# Patient Record
Sex: FEMALE | Race: OTHER | URBAN - METROPOLITAN AREA
[De-identification: names, ages, dates, MRNs, and addresses within clinical notes are randomized per-mention and may not be internally consistent; named-entity substitution may affect disease eponyms.]

---

## 2020-08-05 ENCOUNTER — HOSPITAL ENCOUNTER (OUTPATIENT)
Dept: LAB | Age: 45
Discharge: HOME | End: 2020-08-05

## 2020-08-05 PROCEDURE — 87086 URINE CULTURE/COLONY COUNT: CPT

## 2020-08-05 PROCEDURE — 87186 SC STD MICRODIL/AGAR DIL: CPT

## 2020-08-05 PROCEDURE — 87077 CULTURE AEROBIC IDENTIFY: CPT

## 2020-08-08 LAB
BACTERIA SPEC CULT: ABNORMAL
CC UR VC: ABNORMAL
SERVICE CMNT-IMP: ABNORMAL

## 2020-09-29 NOTE — PATIENT INSTRUCTIONS
Períodos menstruales abundantes: Instrucciones de cuidado  Heavy Menstrual Periods: Care Instructions  Instrucciones de cuidado    Muchas mujeres tienen períodos menstruales abundantes y cólicos dolorosos. Para algunas mujeres esto significa eliminar coágulos de mariano de gran tamaño y cambiarse la toalla sanitaria o el tampón con frecuencia. También pueden tener períodos que newton más de 7 [address] Un cambio hormonal o kinsey irritación uterina puede causar sangrado abundante. Las mujeres que tienen exceso de peso son más propensas a tener períodos menstruales abundantes. Sin embargo, quizá no haya kinsey causa específica para los períodos menstruales abundantes. Michelle médico podría recomendarle tratamientos hormonales para frenar o detener sofia períodos. Si lo que está causando el sangrado intenso es un fibroma uterino (crecimiento que no es cáncer), michelle médico podría recomendarle cirugía u otros tratamientos para extraerlo. Ya que la pérdida de mariano por los períodos menstruales abundantes puede hacer que usted se sienta muy cansada y débil ([place]), michelle médico podría recomendarle que tome duarte adicional.  La atención de seguimiento es kinsey parte clave de michelle tratamiento y seguridad. Asegúrese de hacer y acudir a todas las citas, y llame a michelle médico si está teniendo problemas. También es kinsey buena idea saber los resultados de sofia exámenes y mantener kinsey lista de los medicamentos que jolene. ¿Cómo puede cuidarse en el hogar? · Descanse bastante. · Lleve un registro de sofia períodos. Anote cuándo comienza y cuándo termina michelle período, y [de-identified] flujo tiene. Mesita implica contar la cantidad de toallas sanitarias y tampones que [place]. Anote si están empapados. Anote cualquier otro síntoma. Janelle West registro a sofia citas con el médico.  · [employer] medicamentos exactamente ally le fueron recetados. Llame a michelle médico si beatriz estar teniendo problemas con michelle medicamento.   · [employer] analgésicos (medicamentos para el dolor) exactamente según las indicaciones. ? Si el médico le recetó un analgésico, tómelo según las indicaciones. ? Si no está tomando un analgésico recetado, pregúntele a michelle médico si puede yolis peng de [employer]. · Trate de alcanzar un peso saludable. Si está tratando de bajar de [place], hágalo poco a poco, con la asesoría del médico.  · Si está tomando pastillas de duarte:  ? Trate de yolis las pastillas aproximadamente 1 hora antes o 2 horas después de comer. Sin embargo, podría necesitar yolis el duarte con un poco de comida para evitar el malestar estomacal.  ? La vitamina C (de alimentos o pastillas) ayuda a michelle organismo a absorber el duarte. Trate de yolis las pastillas de duarte con un vaso de jugo de naranja u otra fruta cítrica. ? No tome antiácidos, leche o bebidas con cafeína (ally café, té o bebidas de cola) al mismo tiempo o 2 horas antes o después de magda tomado las pastillas de duarte. Estos pueden dificultar la absorción del duarte en el organismo. ? Las pastillas de duarte pueden causar [employer], ally acidez [place], [place], diarrea, estreñimiento y retortijones. Asegúrese de beber abundantes líquidos e incluya en michelle dieta diaria frutas, verduras y [place]. ? Si olvida yolis michelle pastilla de duarte, no tome kinsey dosis doble la próxima vez.  ? Mantenga las pastillas de duarte fuera del alcance de los niños pequeños. La sobredosis de duarte puede ser [employer]. ¿Cuándo debe pedir ayuda? Llame al 911 en cualquier momento que considere que necesita atención de [place]. Por ejemplo, llame si:    · Se desmayó (perdió el conocimiento). Llame a michelle médico ahora mismo o busque atención médica inmediata si:    · Tiene dolor repentino e intenso en el abdomen o la pelvis.     · Tiene sangrado vaginal intenso. Empapa sofia toallas sanitarias o tampones habituales cada hora alma rosa 2 o más horas.     · Siente mareos o aturdimiento, o que está a punto de desmayarse.    Preste especial atención a los cambios en michelle lacy y asegúrese de comunicarse con michelle médico si:    · Tiene un dolor nuevo en el abdomen o la pelvis.     · Tiene fiebre.     · El sangrado empeora o dura más de 1 semana.     · Piensa que podría estar embarazada. ¿Dónde puede encontrar más información en inglés? Vaya a [email or web address]-raphael.info/  Geovanny Montoya F477 en la búsqueda para aprender más acerca de \"Períodos menstruales abundantes: Instrucciones de cuidado. \"  Revisado: 8 noviembre, 0539               ZKCFUEQ del contenido: 12.6  © 2006-2020 Healthwise, Incorporated. Las instrucciones de cuidado fueron adaptadas bajo licencia por [employer] easily beat [employer] (which disclaims liability or warranty for this information). Si usted tiene [place] afección médica o sobre estas instrucciones, siempre pregunte a michelle profesional de lacy. Healthwise, Incorporated niega toda garantía o responsabilidad por michelle uso de esta información. Biopsia de endometrio: Sobre esta prueba  Endometrial Biopsy: About This Test  ¿Qué es? Kinsey biopsia de endometrio es kinsey manera en la que michelle médico jolene kinsey pequeña [place] del revestimiento del útero (endometrio). La muestra se observa bajo un microscopio para janes si contiene células anormales. La biopsia de endometrio ayuda a michelle médico a detectar problemas en el endometrio. ¿Por qué se hace esta prueba? Kinsey biopsia de endometrio se hace para detectar el cáncer uterino. La prueba también se hace si usted tiene sangrado anormal en el útero. [employer] de la prueba muestran cómo las hormonas de michelle cuerpo están afectando el revestimiento del Shobha Spinner, ally alma rosa la menopausia. ¿Cómo se prepara usted para esta prueba? · Si jolene aspirina o algún otro medicamento para prevenir la formación de coágulos sanguíneos, pregúntele a michelle médico si debe dejar de yolis esos medicamentos antes de la prueba. Asegúrese de que entiende exactamente lo que michelle médico quiere que jose.  Estos medicamentos aumentan el riesgo de sangrado. · Pregúntele a michelle médico si debe yolis un analgésico (medicamento para el dolor), ally ibuprofeno (Advil o Motrin), entre 30 y 60 minutos antes de la prueba. [place] puede ayudar a reducir el dolor de los cólicos que la prueba pueda causar. ¿Qué ocurre antes de la prueba? · Deberá vaciar la vejiga inmediatamente antes de la prueba. · Se le solicitará que firme un formulario de consentimiento que indica que comprende los riesgos de la prueba y que acepta Marcin Mcginnisothy. ¿Cómo se hace la prueba? · Se recostará boca arriba en kinsey ramos de exploración. Tendrá los pies apoyados en estribos. · Es posible que el médico utilice un aerosol o kinsey inyección para adormecerle el eugenia uterino. El eugenia uterino es la abertura del [place]. · El médico usará un instrumento llamado espéculo para observar el eugenia uterino. · Luego, el médico pasará un tubo rosa a través del eugenia uterino para yolis [place] del revestimiento del [place]. · La muestra se envía a un laboratorio. ¿Cuánto tiempo dura la prueba? La prueba durará entre 5 y 15 minutos, aproximadamente. ¿Qué ocurre después de la prueba? · Probablemente pueda irse a casa de inmediato. · Es probable que tenga un leve sangrado vaginal y retortijones por unos días después de la prueba. Los retortijones pueden sentirse ally cólicos menstruales [place]. ¿Dónde puede encontrar más información en inglés? Vaya a http://geetha-raphael.info/  Ariel Gess V957 en la búsqueda para aprender más acerca de \"Biopsia de endometrio: Sobre esta prueba. \"  Revisado: 8 noviembre, 3372               YKIVHTN del contenido: 12.6  © 5216-1109 [employer], Incorporated. Las instrucciones de cuidado fueron adaptadas bajo licencia por [employer] Conne[employer] (which disclaims liability or warranty for this information). Si usted tiene [place] afección médica o sobre estas instrucciones, siempre pregunte a michelle profesional de lacy. Healthwise, Incorporated niega toda garantía o responsabilidad por michelle uso de esta información.

## 2020-09-30 ENCOUNTER — OFFICE VISIT (OUTPATIENT)
Dept: OBGYN CLINIC | Age: 45
End: 2020-09-30
Payer: SUBSIDIZED

## 2020-09-30 VITALS — WEIGHT: 188 LBS | DIASTOLIC BLOOD PRESSURE: 88 MMHG | SYSTOLIC BLOOD PRESSURE: 134 MMHG

## 2020-09-30 DIAGNOSIS — N92.4 EXCESSIVE BLEEDING IN PREMENOPAUSAL PERIOD: ICD-10-CM

## 2020-09-30 DIAGNOSIS — N93.9 ABNORMAL UTERINE BLEEDING (AUB): Primary | ICD-10-CM

## 2020-09-30 DIAGNOSIS — Z97.5 IUD (INTRAUTERINE DEVICE) IN PLACE: ICD-10-CM

## 2020-09-30 DIAGNOSIS — D64.9 ANEMIA, UNSPECIFIED TYPE: ICD-10-CM

## 2020-09-30 LAB
HCG URINE, QL. (POC): NEGATIVE
VALID INTERNAL CONTROL?: YES

## 2020-09-30 PROCEDURE — 99203 OFFICE O/P NEW LOW 30 MIN: CPT | Performed by: OBSTETRICS & GYNECOLOGY

## 2020-09-30 PROCEDURE — 81025 URINE PREGNANCY TEST: CPT | Performed by: OBSTETRICS & GYNECOLOGY

## 2020-09-30 RX ORDER — LANOLIN ALCOHOL/MO/W.PET/CERES
CREAM (GRAM) TOPICAL
COMMUNITY
Start: 2020-08-14 | End: 2021-07-26

## 2020-09-30 NOTE — PROGRESS NOTES
[address] [facility]  [email or web address]  [phone]    Trish Crigler, MD, [address] [facility]       OB/GYN Problem visit    Chief Complaint:   Chief Complaint   Patient presents with   Valri Davidson Menstrual Problem        [id number] used today     Last or next WWE is: pt is new     History of Present Illness: This is a new problem being evaluated by this provider. The patient is a 39 y.o. No obstetric history on file. female who reports having abnormal vaginal bleeding for 2 months. Heavy menses but typical since got [place].   hgb down from ~11 to ~8 outside clinic. Outside [address], wnl: ES 7mm  She reports the symptoms are is unchanged. Aggravating factors include none. Alleviating factors include none. Not dizzy/weak. She does not have other concerns. LMP: Patient's last menstrual period was 09/21/2020. PFSH:  History reviewed. No pertinent past medical history. History reviewed. No pertinent surgical history. History reviewed. No pertinent family history. Social History     Tobacco Use    Smoking status: Not on file   Substance Use Topics    Alcohol use: Not on file    Drug use: Not on file     Not on File  Current Outpatient Medications   Medication Sig    ferrous sulfate 325 mg (65 mg iron) tablet TAKE 1 TABLET BY MOUTH TWICE DAILY     No current facility-administered medications for this visit.         Review of Systems:  History obtained from the patient  Constitutional: negative for fevers, chills and weight loss  ENT ROS: negative for - hearing change, oral lesions or visual changes  Respiratory: negative for cough, wheezing or dyspnea on exertion  Cardiovascular: negative for chest pain, irregular heart beats, exertional chest pressure/discomfort  Gastrointestinal: negative for dysphagia, nausea and vomiting  Genito-Urinary ROS:  see HPI  Inteument/breast: negative for rash, breast lump and nipple discharge  Musculoskeletal:negative for stiff joints, neck pain and muscle weakness  Endocrine ROS: negative for - breast changes, galactorrhea or temperature intolerance  Hematological and Lymphatic ROS: negative for - blood clots, bruising or swollen lymph nodes    Physical Exam:  Pain -0  Visit Vitals  /88   Wt 188 lb (85.3 kg)       GENERAL: alert, well appearing, and in no distress  HEAD: normocephalic, atraumatic. PULM: clear to auscultation, no wheezes, rales or rhonchi, symmetric air entry   COR: normal rate and regular rhythm, S1 and S2 normal   ABDOMEN: soft, nontender, nondistended, no masses or organomegaly   EGBUS: no lesions, no inflammation, no masses  VULVA: normal appearing vulva with no masses, tenderness or lesions  VAGINA: normal appearing vagina with normal color, no lesions, no discharge  CERVIX: normal appearing cervix without discharge or lesions, non tender  · IUD strings seen and appropriate length  UTERUS: uterus is normal size, shape, consistency and nontender   ADNEXA: normal adnexa in size, nontender and no masses  NEURO: alert, oriented, normal speech    Assessment:  Encounter Diagnoses   Name Primary?  Abnormal uterine bleeding (AUB) Yes    IUD (intrauterine device) in place     Excessive bleeding in premenopausal period     Anemia, unspecified type        Plan:  The patient is advised that she should contact the office if she does not note improvement or if symptoms recur  Recommend follow up with PCP for non-gynecologic complaints and chronic medical problems. She should contact our office with any questions or concerns  She could keep her routine annual exam appointment. rec fu endo bx  Get pap records  Disc hormonal options, changing IUD to Mirena, progesterone management options. Bleeding precautions  Continue iron. We discussed potential causes of symptomatic bleeding: including but not limited to hormonal, medical, infection/inflammation and structural etiologies.     Shanda ramirez ([place])    Reviewed clinic notes, labs, us, wwe    Orders Placed This Encounter    CT/NG/T.VAGINALIS AMPLIFICATION    AMB POC URINE PREGNANCY TEST, VISUAL COLOR COMPARISON       No results found for this visit on 09/30/20.

## 2020-10-03 LAB
C TRACH RRNA SPEC QL NAA+PROBE: NEGATIVE
N GONORRHOEA RRNA SPEC QL NAA+PROBE: NEGATIVE
T VAGINALIS DNA SPEC QL NAA+PROBE: NEGATIVE

## 2021-07-23 NOTE — PATIENT INSTRUCTIONS
Endometrial Biopsy: About This Test  What is it? An endometrial biopsy is a way for your doctor to take a small sample of the lining of the uterus (endometrium). The sample is looked at under a microscope for abnormal cells. An endometrial biopsy helps your doctor find problems in the endometrium. Why is this test done? An endometrial biopsy is done to check for cancer of the uterus. The test is also done if you have abnormal bleeding from your uterus. The test results show how your body's hormones are affecting the lining of the uterus, such as during menopause. How do you prepare for the test?  · If you take aspirin or some other blood thinner, ask your doctor if you should stop taking it before your test. Make sure that you understand exactly what your doctor wants you to do. These medicines increase the risk of bleeding. · Ask your doctor if you should take a pain reliever, such as ibuprofen (Advil or Motrin), 30 to 60 minutes before the test. This can help reduce any cramping pain that the test can cause. How is the test done? · You will lie on an exam table. Your feet will be in stirrups. · The doctor may use a spray or injection to numb your cervix. The cervix is the opening to the uterus. · The doctor will use a tool called a speculum to see the cervix. · Then the doctor will pass a thin tube through the cervix to take a sample of the uterus lining. · The sample is sent to a lab. How long does the test take? The test will take about 5 to 15 minutes. What happens after the test?  · You will probably be able to go home right away. · You likely will have mild vaginal bleeding and may have cramps for a few days after the test. The cramps may feel like bad menstrual cramps. How can you care for yourself at home? · Ask your doctor if you can take an over-the-counter pain medicine, such as acetaminophen (Tylenol), ibuprofen (Advil, Motrin), or naproxen (Aleve). Be safe with medicines.  Read and follow all instructions on the label. · Use pads for vaginal bleeding or discharge. · You may return to all your usual activities (including sex) when you feel like it. Follow-up care is a key part of your treatment and safety. Be sure to make and go to all appointments, and call your doctor if you are having problems. It's also a good idea to keep a list of the medicines you take. Ask your doctor when you can expect to have your test results. Where can you learn more? Go to http://www.gray.com/  Enter V957 in the search box to learn more about \"Endometrial Biopsy: About This Test.\"  Current as of: July 17, 2020               Content Version: 12.8  © 2006-2021 Healthwise, Incorporated. Care instructions adapted under license by [employer] (which disclaims liability or warranty for this information). If you have questions about a medical condition or this instruction, always ask your healthcare professional. [place] [address] any warranty or liability for your use of this information.

## 2021-07-26 ENCOUNTER — OFFICE VISIT (OUTPATIENT)
Dept: OBGYN CLINIC | Age: 46
End: 2021-07-26
Payer: COMMERCIAL

## 2021-07-26 VITALS
HEIGHT: 60 IN | BODY MASS INDEX: 37.46 KG/M2 | HEART RATE: 74 BPM | SYSTOLIC BLOOD PRESSURE: 128 MMHG | WEIGHT: 190.8 LBS | DIASTOLIC BLOOD PRESSURE: 86 MMHG

## 2021-07-26 DIAGNOSIS — N92.6 IRREGULAR MENSES: ICD-10-CM

## 2021-07-26 DIAGNOSIS — Z97.5 IUD (INTRAUTERINE DEVICE) IN PLACE: ICD-10-CM

## 2021-07-26 DIAGNOSIS — N93.9 ABNORMAL UTERINE BLEEDING (AUB): Primary | ICD-10-CM

## 2021-07-26 DIAGNOSIS — Z01.812 PRE-PROCEDURAL LABORATORY EXAMINATION: ICD-10-CM

## 2021-07-26 LAB
HCG URINE, QL. (POC): NEGATIVE
VALID INTERNAL CONTROL?: YES

## 2021-07-26 PROCEDURE — 58100 BIOPSY OF UTERUS LINING: CPT | Performed by: OBSTETRICS & GYNECOLOGY

## 2021-07-26 PROCEDURE — 81025 URINE PREGNANCY TEST: CPT | Performed by: OBSTETRICS & GYNECOLOGY

## 2021-07-26 PROCEDURE — 99213 OFFICE O/P EST LOW 20 MIN: CPT | Performed by: OBSTETRICS & GYNECOLOGY

## 2021-07-26 RX ORDER — MEDROXYPROGESTERONE ACETATE 10 MG/1
TABLET ORAL
COMMUNITY
Start: 2021-06-21 | End: 2021-07-26

## 2021-07-26 RX ORDER — MEDROXYPROGESTERONE ACETATE 150 MG/ML
150 INJECTION, SUSPENSION INTRAMUSCULAR ONCE
Qty: 1 ML | Refills: 1 | Status: SHIPPED | OUTPATIENT
Start: 2021-07-26 | End: 2021-07-26

## 2021-07-26 NOTE — PROGRESS NOTES
[address] [facility]  [email or web address]  [phone]    Mary Jade MD, [address] [facility]       OB/GYN Problem visit    Chief Complaint:   Chief Complaint   Patient presents with    Endometrial Biopsy       Last or next WWE is: due    : [id number]      History of Present Illness: This is not a new problem being evaluated by this provider. The patient is a 55 y.o. LMP 7/14 then spotting on Friday 7/23. Her cycles are sometimes heavy, sometimes light. She has a cycle every month, sometimes twice per month. Her sx have been going on the past year. Pt took provera this month she got from [employer]". She will sign CHUCK at the end of her visit. Denies cramping. Had wwe/pap and labs done at [address] [place] [address] and was also seen at [address] [facility] last week. She does not have other concerns. LMP: Patient's last menstrual period was 07/14/2021. PFSH:  Past Medical History:   Diagnosis Date    Pap smear for cervical cancer screening 07/17/2018    Negative, HPV negative     No past surgical history on file. No family history on file. Social History     Tobacco Use    Smoking status: Never Smoker    Smokeless tobacco: Never Used   Substance Use Topics    Alcohol use: Not on file    Drug use: Not on file     No Known Allergies  Current Outpatient Medications   Medication Sig    medroxyPROGESTERone (DEPO-PROVERA) 150 mg/mL injection 1 mL by IntraMUSCular route once for 1 dose. Hold for nurse injection.  medroxyPROGESTERone (PROVERA) 10 mg tablet TAKE 1 TABLET BY MOUTH ONCE DAILY WITH FOOD FOR 10 DAYS (Patient not taking: Reported on 7/26/2021)    ferrous sulfate 325 mg (65 mg iron) tablet TAKE 1 TABLET BY MOUTH TWICE DAILY (Patient not taking: Reported on 7/26/2021)     No current facility-administered medications for this visit.        Review of Systems:  History obtained from the patient  Constitutional: negative for fevers, chills and weight loss  ENT ROS: negative for - hearing change, oral lesions or visual changes  Respiratory: negative for cough, wheezing or dyspnea on exertion  Cardiovascular: negative for chest pain, irregular heart beats, exertional chest pressure/discomfort  Gastrointestinal: negative for dysphagia, nausea and vomiting  Genito-Urinary ROS:  see HPI  Inteument/breast: negative for rash, breast lump and nipple discharge  Musculoskeletal:negative for stiff joints, neck pain and muscle weakness  Endocrine ROS: negative for - breast changes, galactorrhea or temperature intolerance  Hematological and Lymphatic ROS: negative for - blood clots, bruising or swollen lymph nodes    Physical Exam:  Visit Vitals  /86 (BP 1 Location: Right upper arm, BP Patient Position: Sitting, BP Cuff Size: Adult)   Pulse 74   Ht 5' (1.524 m)   Wt 190 lb 12.8 oz (86.5 kg)   BMI 37.26 kg/m²       GENERAL: alert, well appearing, and in no distress  HEAD: normocephalic, atraumatic. PULM: clear to auscultation, no wheezes, rales or rhonchi, symmetric air entry   COR: normal rate and regular rhythm, S1 and S2 normal   ABDOMEN: soft, nontender, nondistended, no masses or organomegaly   EGBUS: no lesions, no inflammation, no masses  VULVA: normal appearing vulva with no masses, tenderness or lesions  VAGINA: normal appearing vagina with normal color, no lesions, minimal blood tinged discharge  CERVIX: normal appearing cervix without discharge or lesions, non tender  · IUD strings seen and appropriate length  UTERUS: uterus is normal size, shape, consistency and nontender   ADNEXA: normal adnexa in size, nontender and no masses  NEURO: alert, oriented, normal speech    Assessment:  Encounter Diagnoses   Name Primary?     Abnormal uterine bleeding (AUB) Yes    Pre-procedural laboratory examination     Irregular menses     IUD (intrauterine device) in place        Plan:  The patient is advised that she should contact the office if she does not note improvement or if symptoms recur  Recommend follow up with PCP for non-gynecologic complaints and chronic medical problems. She should contact our office with any questions or concerns  She could keep her routine annual exam appointment. We discussed potential causes of symptomatic bleeding: including but not limited to hormonal, medical, infection/inflammation and structural etiologies. We discussed options for managing symptoms including but not limited to observation, NSAIDS, hormonal management, IUDs, ablation, and hysterectomy. Pt too late to do [address] but wants to proceed with endo bx  We discussed progesterone only and non hormonal options for contraception including but not limited to condoms, IUDs, Nexplanon, and depo provera. Pt wants to do trial of depo provera  We discussed typical perimenopausal bleeding patterns and symptoms. I recommend that she notify MD for chaotic or symptomatic bleeding, or bleeding in between menses or intermenstrual bleeding for additional evaluation. She can also schedule a consult to discuss management of symptoms of perimenopause that are concerning her or interfering with her life or day to day function or activity. Get WWE last pap records  Get ER records    Rec fu and US possible SIS, await bx results    Can have depo started at [facility] or [facility]. On this date, 7/26/2021,  I have spent 20 minutes reviewing previous notes, test results and face to face with the patient discussing the diagnosis and importance of compliance with the treatment plan as well as documenting on the day of the visit.  used.       Orders Placed This Encounter    BIOPSY OF UTERUS LINING    AMB POC URINE PREGNANCY TEST, VISUAL COLOR COMPARISON    medroxyPROGESTERone (DEPO-PROVERA) 150 mg/mL injection    SURGICAL PATHOLOGY       Results for orders placed or performed in visit on 07/26/21   AMB POC URINE PREGNANCY TEST, VISUAL COLOR COMPARISON   Result Value Ref Range    VALID INTERNAL CONTROL POC Yes     HCG urine, Ql. (POC) Negative Negative

## 2021-07-26 NOTE — PROGRESS NOTES
Daly Warren MD, [address] [facility]  [address]  [address] [facility]  [phone]     [facility]  OFFICE PROCEDURE PROGRESS NOTE        Chart reviewed for the following:   Betty GROVER, have reviewed the History, Physical and updated the Allergic reactions for Rahu 37 performed immediately prior to start of procedure:   Betty GROVER, have performed the following reviews on [address] prior to the start of the procedure:            * Patient was identified by name and date of birth   * Agreement on procedure being performed was verified  * Risks and Benefits explained to the patient  * Procedure site verified and marked as necessary  * Patient was positioned for comfort  * Consent was signed and verified     Time: 4:20pm      Date of procedure: 7/26/2021    Procedure performed by: Cathryn Aguirre MD    Provider assisted by: Betty Villalobos, [address]    Patient assisted by: self    How tolerated by patient: tolerated the procedure well with no complications    Post Procedural Pain Scale: 0 - No Hurt    Comments: none              Procedure note: Endometrial biopsy     [id number] is a No obstetric history on file. ,  55 y.o. female / No LMP recorded. The patient has a history of . AUB/ irregular menses. Has Paraguard IUD  After the indications, risks, benefits, and alternatives to performing an endometrial biopsy were explained to the patient, her questions were answered and informed consent was obtained. Patient wanted to proceed with office endometrial biopsy evaluation. Procedure: The patient was placed on the table in the dorsal lithotomy position. A bimanual exam showed the uterus to be anterior. The uterus was not enlarged. A speculum was placed in the vagina. The cervix was visualized and prepped with zephrin. A tenaculum was not placed on the anterior lip of the cervix for traction. It was was not necessary to dilate the cervix. A pipelle was passed through the endocervical canal without difficulty. The uterus was sounded to 7 cm's. A medium amount of tissue was returned. This tissue was placed in formalin and sent to pathology. It was felt that an adequate sample was obtained. The patient tolerated the procedure well and she reported level of cramping as mild. Good hemostasis was noted. All instruments were removed. Post Procedural Status: The patient was observed for 10 minutes after the procedure. She had pain level:  mild at the time of discharge. There were no complications. The patient was discharged in stable condition. The patient was given routine post endometrial biopsy instructions. She should notify MD with any questions, concerns, fever, or worsening pain. We discussed that she will be contacted with the pathology results.      Consider US vs SIS  Get records

## 2021-08-08 NOTE — PROGRESS NOTES
PMH: endo bx  Pathology: not sufficient, benign endocervical  (Need to repeat endometrial sampling)  Notify pt if natue message not read or not active.   Update PMH, (even for office procedure) Include date of procedure  procedure name (check office note prn),   Rec SIS and repeat endometrial biopsy (may need )

## 2021-08-30 NOTE — PROGRESS NOTES
ID: [id number]    Follow up call to Ms. Deya Floyd - Pt verified self and birth date for privacy precautions. Patient was advised of results. Pt has been lightly bleeding for 3 weeks. Pt is placed on the schedule for 9/16 for an SIS & repeat endo bx. Pt is going to call a few days before her appt to tell us how much bleeding she is having; advised we prefer to do the procedure when she is not bleeding for best results. Pt was advised to premedicate with Ibuprofen 1 hour prior to her appt to help with discomfort. Ms. Deya Floyd acknowledged understanding and all questions were answered to patients satisfaction. No further questions or concerns at this time.

## 2021-09-16 ENCOUNTER — TELEPHONE (OUTPATIENT)
Dept: OBGYN CLINIC | Age: 46
End: 2021-09-16

## 2021-09-16 NOTE — TELEPHONE ENCOUNTER
Pt calling to see if she can still have her procedures done today. Pt is still bleeding but lighter. This RN confirmed with MD that she is able to still come for procedures. Pt verbalized understanding.

## 2021-12-16 ENCOUNTER — HOSPITAL ENCOUNTER (OUTPATIENT)
Dept: LAB | Age: 46
Discharge: HOME | End: 2021-12-16

## 2021-12-16 ENCOUNTER — OFFICE VISIT (OUTPATIENT)
Dept: OBGYN CLINIC | Age: 46
End: 2021-12-16
Payer: COMMERCIAL

## 2021-12-16 VITALS
WEIGHT: 192.2 LBS | SYSTOLIC BLOOD PRESSURE: 131 MMHG | BODY MASS INDEX: 37.54 KG/M2 | DIASTOLIC BLOOD PRESSURE: 88 MMHG | HEART RATE: 92 BPM

## 2021-12-16 DIAGNOSIS — Z30.432 ENCOUNTER FOR IUD REMOVAL: ICD-10-CM

## 2021-12-16 DIAGNOSIS — N93.9 ABNORMAL UTERINE BLEEDING (AUB): Primary | ICD-10-CM

## 2021-12-16 DIAGNOSIS — Z01.812 PRE-PROCEDURE LAB EXAM: ICD-10-CM

## 2021-12-16 LAB
HCG URINE, QL. (POC): NEGATIVE
VALID INTERNAL CONTROL?: YES

## 2021-12-16 PROCEDURE — 99213 OFFICE O/P EST LOW 20 MIN: CPT | Performed by: OBSTETRICS & GYNECOLOGY

## 2021-12-16 PROCEDURE — 58301 REMOVE INTRAUTERINE DEVICE: CPT | Performed by: OBSTETRICS & GYNECOLOGY

## 2021-12-16 PROCEDURE — 81025 URINE PREGNANCY TEST: CPT | Performed by: OBSTETRICS & GYNECOLOGY

## 2021-12-16 PROCEDURE — 58100 BIOPSY OF UTERUS LINING: CPT | Performed by: OBSTETRICS & GYNECOLOGY

## 2021-12-16 RX ORDER — NORETHINDRONE ACETATE AND ETHINYL ESTRADIOL 1MG-20(21)
1 KIT ORAL DAILY
Qty: 3 DOSE PACK | Refills: 0 | Status: SHIPPED | OUTPATIENT
Start: 2021-12-16 | End: 2022-03-15 | Stop reason: SDUPTHER

## 2021-12-16 NOTE — PATIENT INSTRUCTIONS
Píldoras anticonceptivas combinadas: Instrucciones de cuidado  Combination Birth Control Pills: Care Instructions  Generalidades     Las píldoras anticonceptivas combinadas se [place] para prevenir el [facility]. Le suministran kinsey dosis regular de las hormonas estrógeno y progestina. Usted jolene kinsey [employer] días para prevenir el embarazo. Las píldoras anticonceptivas vienen en paquetes. El tipo más común tiene píldoras de hormonas para 3 semanas. Algunos paquetes tienen píldoras de azúcar (que no contienen ningún tipo de hormonas) para la cuarta semana. Alma Rosa la cuarta semana, la sin hormona, tiene michelle período. Después de la cuarta semana (28 días), empieza con un nuevo paquete. Algunas píldoras anticonceptivas vienen envasadas de diferentes maneras. Por ejemplo, algunas contienen pastillas de hormonas para la cuarta semana en lugar de pastillas de azúcar. [place] se conoce ally uso continuo. Marlen hormonas alma rosa todo el mes hace que no tenga períodos o que tenga menos períodos. Otras están envasadas de modo que tenga un período cada 3 meses. Michelle médico le dirá qué tipo de pastillas tiene usted. La atención de seguimiento es kinsey parte clave de michelle tratamiento y seguridad. Asegúrese de hacer y acudir a todas las citas, y llame a michelle médico si está teniendo problemas. También es kinsey buena idea saber los resultados de sofia exámenes y mantener kinsey lista de los medicamentos que jolene. ¿Cómo puede cuidarse en el hogar? ¿Cómo se jolene la píldora anticonceptiva? · Siga las instrucciones de michelle médico sobre cuándo empezar a [employer]. Use un método anticonceptivo de respaldo, ally un condón, o no tenga relaciones sexuales alma rosa 7 días después de empezar con las píldoras. · [place] sofia píldoras todos los días, aproximadamente a la misma hora del día. Para facilitar esto, trate de tomarlas con algo que hace cada día, ally cepillarse los dientes.   · Usted puede usar la píldora continuamente y saltarse michelle período. Cuando llega la semana en la que jolene pastillas sin hormonas, omita esas pastillas y comience de inmediato con michelle próximo paquete de pastillas anticonceptivas. Continúe tomando michelle [employer]. Hable con michelle médico si tiene cualquier pregunta. ¿Qué ocurre si olvida yolis kinsey pastilla? Siempre juan la etiqueta para obtener instrucciones específicas, o llame a michelle médico. Aquí hay algunas pautas básicas:  · Si omitió yolis 1 pastilla de hormonas, tómela tan pronto ally se acuerde. Pregúntele a michelle médico si irvin vez tenga que usar un método anticonceptivo de [place], ally un condón, o no tener relaciones sexuales. · Si omitió 2 o más pastillas de hormonas, tome kinsey tan pronto ally recuerde que se olvidó de tomarlas. Luego, juan la etiqueta de las pastillas o llame a michelle médico acerca de instrucciones de cómo yolis las pastillas omitidas. Use un método anticonceptivo de respaldo o no tenga relaciones sexuales alma rosa 7 nell. [place] es más probable si se olvida de yolis más de 1 [place]. · Si tuvo relaciones sexuales, puede usar un anticonceptivo de emergencia para ayudar a prevenir el [place]. El método anticonceptivo de emergencia más eficaz es el DIU de cobre (insertado por un médico). También puede obtener pastillas anticonceptivas de emergencia sin Erin Riggers en la mayoría de las [place]. ¿Qué más debe saber? · La píldora puede tener efectos secundarios. ? Puede tener períodos omitidos o muy ligeros. ? Puede tener [employer] períodos Burch Stefania). [place] suele disminuir al cabo de 3 o 4 meses. Si [place] la píldora continuamente, no tendrá períodos. Keith es posible que aún tenga sangrado intermenstrual. Consulte a michelle médico si tiene problemas de sangrado intermenstrual. Incluso si tiene mihir tipo de sangrado, la píldora aún debería funcionar blake. ? Irvin vez tenga inestabilidad emocional, menos interés en el sexo o aumento de [place].   · La píldora puede reducir el acné, el sangrado abundante y los cólicos, y los síntomas del síndrome premenstrual.  · Consulte a michelle médico antes de usar ningún otro medicamento, incluidos medicamentos de venta buck, vitaminas, productos herbarios y suplementos. Las hormonas anticonceptivas podrían no ser menezes eficaces para prevenir el embarazo cuando se combinan con otros medicamentos. · La píldora no protege contra las infecciones de transmisión sexual (STI, por sofia siglas en inglés), ally el herpes o el VIH/SIDA. Si no está starr de si michelle calli o parejas sexuales pudieran tener kinsey STI, utilice un condón para ayudar a protegerse de la enfermedad. ¿Cuándo debe pedir ayuda? Llame a michelle médico ahora mismo o busque atención médica inmediata si:    · Tiene dolor abdominal intenso.     · Tiene señales de un coágulo de [place], ally:  ? Dolor en la pantorrilla, el muslo, la [place] o detrás de la rodilla. ? Enrojecimiento e hinchazón en la pierna o la [place].     · Tiene visión borrosa u otros problemas de la visión.     · Tiene un dolor de phuc intenso.     · Tiene graves dificultades para respirar. Preste especial atención a los cambios en michelle lacy y asegúrese de comunicarse con michelle médico si:    · Piensa que podría estar embarazada.     · Piensa que puede estar deprimida.     · Ashlee que puede magda Art expuesta a, o tiene, kinsey infección de transmisión sexual.   ¿Dónde puede encontrar más información en inglés? Vaya a [email or web address]  Nellie Paz [id number] en la búsqueda para aprender más acerca de \"Píldoras anticonceptivas combinadas: Instrucciones de cuidado. \"  Revisado: 11 febrero, 2021               Versión del contenido: 13.0  © 2301-0157 Healthwise, Incorporated. Las instrucciones de cuidado fueron adaptadas bajo licencia por [employer] (which disclaims liability or warranty for this information).  Si usted tiene [place] afección médica o sobre estas instrucciones, siempre pregunte a michelle profesional de lacy. [facility] niega toda garantía o responsabilidad por michelle uso de esta información.

## 2021-12-16 NOTE — PROGRESS NOTES
Video  ID [id number]       [facility]  [email or web address]  [phone]    Rashard Loomis MD, FACOG       OB/GYN Problem visit    Chief Complaint:   Chief Complaint   Patient presents with    Vaginal Bleeding    Follow-up    Ultrasound     SIS    Removal Iud        History of Present Illness: This is not a new problem being evaluated by this provider. The patient is a 55 y.o. No obstetric history on file. female who reports having AUB  for several  months. She reports the symptoms are has improved. Aggravating factors include none. Alleviating factors include depo provera. She does not have other concerns. LMP: No LMP recorded. PFSH:  Past Medical History:   Diagnosis Date    Pap smear for cervical cancer screening 07/17/2018    Negative, HPV negative     Past Surgical History:   Procedure Laterality Date    HX OTHER SURGICAL  07/26/2021    endometrial biopsy: benign/unsuffiecent endometrium --> rec repeat; TP     No family history on file. Social History     Tobacco Use    Smoking status: Never Smoker    Smokeless tobacco: Never Used   Substance Use Topics    Alcohol use: Not on file    Drug use: Not on file     No Known Allergies  Current Outpatient Medications   Medication Sig    norethindrone-ethinyl estradiol (JUNEL FE 1/20) 1 mg-20 mcg (21)/75 mg (7) tab Take 1 Tablet by mouth daily.  FeroSuL 325 mg (65 mg iron) tablet     medroxyPROGESTERone (Depo-Provera) 150 mg/mL syrg 1 ml     No current facility-administered medications for this visit.        Review of Systems:  History obtained from the patient  Constitutional: negative for fevers, chills and weight loss  ENT ROS: negative for - hearing change, oral lesions or visual changes  Respiratory: negative for cough, wheezing or dyspnea on exertion  Cardiovascular: negative for chest pain, irregular heart beats, exertional chest pressure/discomfort  Gastrointestinal: negative for dysphagia, nausea and vomiting  Genito-Urinary ROS:  see HPI  Inteument/breast: negative for rash, breast lump and nipple discharge  Musculoskeletal:negative for stiff joints, neck pain and muscle weakness  Endocrine ROS: negative for - breast changes, galactorrhea or temperature intolerance  Hematological and Lymphatic ROS: negative for - blood clots, bruising or swollen lymph nodes    Physical Exam:  Visit Vitals  /88   Pulse 92   Wt 192 lb 3.2 oz (87.2 kg)   BMI 37.54 kg/m²       GENERAL: alert, well appearing, and in no distress  HEAD: normocephalic, atraumatic. ABDOMEN: soft, nontender, nondistended, no masses or organomegaly   EGBUS: no lesions, no inflammation, no masses  VULVA: normal appearing vulva with no masses, tenderness or lesions  VAGINA: normal appearing vagina with normal color, no lesions, without discharge  CERVIX: normal appearing cervix without discharge or lesions, non tender  · IUD strings seen and appropriate length  UTERUS: uterus is normal size, shape, consistency and nontender   ADNEXA: normal adnexa in size, nontender and no masses  NEURO: alert, oriented, normal speech    Assessment:  Encounter Diagnoses   Name Primary?  Abnormal uterine bleeding (AUB) Yes    Pre-procedure lab exam     Encounter for IUD removal        Plan:  The patient is advised that she should contact the office if she does not note improvement or if symptoms recur  Recommend follow up with PCP for non-gynecologic complaints and chronic medical problems. She should contact our office with any questions or concerns  She could keep her routine annual exam appointment. We discussed potential causes of symptomatic bleeding: including but not limited to hormonal, medical, infection/inflammation and structural etiologies. We discussed options for managing symptoms including but not limited to observation, NSAIDS, hormonal management, IUDs, ablation, and hysterectomy.   Pt desires IUD removal and changing methods  We discussed progesterone only and non hormonal options for contraception including but not limited to condoms, IUDs, Nexplanon, and depo provera. Discussed risks, benefits and alternatives of OCP/nuvaring/patch: including but not limited to dvt/pe/mi/cva/ca/gi risks and that smoking, increasing age and other health conditions can increase these risks. Pt wants to retry OCP  On this date, 12/16/2021,  I have spent 20 minutes reviewing previous notes, test results and face to face with the patient discussing the diagnosis and importance of compliance with the treatment plan as well as documenting on the day of the visit. FU 3mos wwe/pap if due and fu sx  Disc how to start OCP and back up with new start        Physician review of ultrasound performed by technician    Today's ultrasound report and images were reviewed and discussed with the patient.   Please see images and imaging report entered by technician in PACS for more detail and progress note and diagnosis entered by MD.    Elliott Tian MD      Orders Placed This Encounter    AMB POC URINE PREGNANCY TEST, VISUAL COLOR COMPARISON    norethindrone-ethinyl estradiol (JUNEL FE 1/20) 1 mg-20 mcg (21)/75 mg (7) tab    SURGICAL PATHOLOGY       Results for orders placed or performed in visit on 12/16/21   AMB POC URINE PREGNANCY TEST, VISUAL COLOR COMPARISON   Result Value Ref Range    VALID INTERNAL CONTROL POC Yes     HCG urine, Ql. (POC) Negative Negative       [facility] [place]  OFFICE PROCEDURE PROGRESS NOTE    Chart reviewed for the following:   Alireza GROVER MD, have reviewed the History, Physical and updated the Allergic reactions for [facility] 37 performed immediately prior to start of procedure:   Alireza GROVER MD, have performed the following reviews on [address] prior to the start of the procedure:            * Patient was identified by name and date of birth   * Agreement on procedure being performed was verified  * Risks and Benefits explained to the patient  * Procedure site verified and marked as necessary  * Patient was positioned for comfort  * Consent was signed and verified     Time: 3:30pm    Date of procedure: 12/16/2021    Procedure performed by: Talita Donohue MD    Provider assisted by:   Rebecca Mojica, [address] [place] [address]    Patient assisted by: self    How tolerated by patient: tolerated the procedure well with no complications    Post Procedural Pain Scale: 0 - No Hurt    Comments: none      Sonohysterography procedure (SIS)    Jayde Coppola is a No obstetric history on file. ,  55 y.o. female / No LMP recorded. She presents for a sonohysterography. The indications for this procedure were reviewed with the patient. The procedure was explained in detail and all questions were answered. Procedure: The patient was placed in the lithotomy position. A graves speculum was introduced into the vagina and the cervix was visualized. The cervix was prepped with zephrin solution. A tenaculum was placed on the anterior cervix for traction. It was not necessary to dilate the cervix. A Cook's Hysterography catheter was then introduced into the uterine cavity and the speculum was removed. Sterile sonohysterography with 3D Reconstruction was performed. The endometrial cavity was distended with sterile saline. The findings are as follows: normal cavity without lesions. IUD in place,  The patient tolerated the procedure well without complication, and was discharged to home.      US report:           Rhonda Bird MD, [facility]  [address] [facility].  [phone]     [facility]  OFFICE PROCEDURE PROGRESS NOTE        Chart reviewed for the following:   ITalita MD, have reviewed the History, Physical and updated the Allergic reactions for Rahu 37 performed immediately prior to start of procedure:   Tru Perez MD, have performed the following reviews on [address] prior to the start of the procedure:            * Patient was identified by name and date of birth   * Agreement on procedure being performed was verified  * Risks and Benefits explained to the patient  * Procedure site verified and marked as necessary  * Patient was positioned for comfort  * Consent was signed and verified     Time: 330pm      Date of procedure: 12/16/2021    Procedure performed by: Tiffani Urbano MD    Provider assisted by: Tatiana Dickson LPN    Patient assisted by: self    How tolerated by patient: tolerated the procedure well with no complications    Post Procedural Pain Scale: 2 - Hurts Little Bit    Comments: none              Procedure note: Endometrial biopsy     [address] is a No obstetric history on file. ,  55 y.o. female / No LMP recorded. The patient has a history of  AUB  . After the indications, risks, benefits, and alternatives to performing an endometrial biopsy were explained to the patient, her questions were answered and informed consent was obtained. Patient wanted to proceed with office endometrial biopsy evaluation. Procedure: The patient was placed on the table in the dorsal lithotomy position. A bimanual exam showed the uterus to be anterior. The uterus was not enlarged. A speculum was placed in the vagina. The cervix was visualized and prepped with zephrin. A tenaculum was placed on the anterior lip of the cervix for traction. It was was not necessary to dilate the cervix. A pipelle was passed through the endocervical canal without difficulty. The uterus was sounded to 7 cm's. A small amount of tissue was returned. This tissue was placed in formalin and sent to pathology. It was felt that an adequate sample was obtained. The patient tolerated the procedure well and she reported level of cramping as mild. Good hemostasis was noted.  All instruments were removed. Post Procedural Status: The patient was observed for 10 minutes after the procedure. She had pain level:  mild at the time of discharge. There were no complications. The patient was discharged in stable condition. The patient was given routine post endometrial biopsy instructions. She should notify MD with any questions, concerns, fever, or worsening pain. We discussed that she will be contacted with the pathology results.

## 2021-12-16 NOTE — PROGRESS NOTES
[address] [place] [address] OB-GYN  [email or web address]  [phone]    Ron Hollis MD, FACOG     IUD REMOVAL  OFFICE PROCEDURE PROGRESS NOTE      Chart reviewed for the following:   Gil GROVER, have reviewed the History, Physical and updated the Allergic reactions for Rahu 37 performed immediately prior to start of procedure:   Gil GROVER, have performed the following reviews on [address] prior to the start of the procedure:            * Patient was identified by name and date of birth   * Agreement on procedure being performed was verified  * Risks and Benefits explained to the patient  * Procedure site verified and marked as necessary  * Patient was positioned for comfort  * Consent was signed and verified     Time: 3:50pm    Date of procedure: 12/16/2021    Procedure performed by: Aaron Mccoy MD    Provider assisted by:   Gil Goodman MA    Patient assisted by: self    How tolerated by patient: tolerated the procedure well with no complications    Comments: none      Procedure Note: IUD REMOVAL    [address] is a No obstetric history on file. ,  55 y.o. female / whose No LMP recorded. was on . who presents today for IUD removal. Her current IUD was placed ~7 years ago per pt  She requests removal of the IUD because the IUD because  AUB. The IUD removal procedure was discussed with the patient and she had no further questions. Procedure: The patient was placed in a dorsal lithotomy position and appropriately draped. A speculum exam was performed and the cervix was visualized. The cervix was prepped with zephiran solution. The IUD string was visualized. Using ring forceps , the string was grasped and the IUD removed intact. The IUD was shown to the patient and discarded. On bimanual exam the uterus was anterior and was normal in size with no tenderness present.     She was given routine post-removal instructions and instructed to notify MD with any questions or concerns.

## 2021-12-21 ENCOUNTER — TELEPHONE (OUTPATIENT)
Dept: OBGYN CLINIC | Age: 46
End: 2021-12-21

## 2021-12-21 RX ORDER — DOXYCYCLINE 100 MG/1
100 CAPSULE ORAL 2 TIMES DAILY
Qty: 20 CAPSULE | Refills: 0 | Status: SHIPPED | OUTPATIENT
Start: 2021-12-21 | End: 2021-12-31

## 2021-12-21 NOTE — TELEPHONE ENCOUNTER
Pt. Was called with  to notify her of recent lab results. Patient did not answer. Patient was left a message to have the patient call back at her earliest convenience to our phone number  [phone], and that the message was urgent. Pt. Was put in out patient reminder box.     : Shae Beverly  ID #: [id number]

## 2021-12-21 NOTE — TELEPHONE ENCOUNTER
Recommend doxycycline 100mg BID x10d. [facility] sent. Should use backup method until next pack of pills.

## 2021-12-21 NOTE — TELEPHONE ENCOUNTER
Dr. Matt Guaman from [facility] is calling on behalf of this patient regarding her recent endometrial biopsy results. She had the biopsy and IUD removal done on 12/16/21. Dr. Matt Guaman reports that the results came back with acute endometritis. He reports that this something rare and the patient needs antibiotics. During the visit on 12/16/21, the patient reported no complaints or fever. Since Uma is out of the office until next week, Dr. Matt Guaman requests that this nurse notifies the on call provider for today. This nurse confirmed that the on call provider would be notified. No further questions were presented at this time.

## 2021-12-21 NOTE — TELEPHONE ENCOUNTER
Pt. Calling back with Interpreters. Verified name and date of birth for privacy precautions. Pt was advised of on call MD recommendations in previous note. Pt. Was advised to use condoms with intercourse because the antibiotic decreases the effect of the birth control pill. Also advised to take with food and avoid alcohol during treatment. Patient was also advised by this nurse that Dr. Elliott Mccarty would be notified when she returns to the office and we would give the patient a call back with further advice from Dr. Elliott Mccarty. Patient verbalized understanding and no further questions presented at this time.     : Madalyn Roberts  ID#: [id number]    : Juju Crane  ID#: [id number]

## 2021-12-29 NOTE — PROGRESS NOTES
Abnormal results. TimeCast message sent, if active. Notify patient, if TimeCast message not read, or not active on [facility]. Update chart, PN labs/problem list, if needed  Acute endometritis/inflammation: rx sent by Dr. Fabi Ge  (IUD removed at visit)  Notify MD if AUB persists.

## 2022-03-17 ENCOUNTER — OFFICE VISIT (OUTPATIENT)
Dept: OBGYN CLINIC | Age: 47
End: 2022-03-17
Payer: COMMERCIAL

## 2022-03-17 VITALS
HEIGHT: 60 IN | WEIGHT: 190.8 LBS | BODY MASS INDEX: 37.46 KG/M2 | HEART RATE: 91 BPM | SYSTOLIC BLOOD PRESSURE: 121 MMHG | DIASTOLIC BLOOD PRESSURE: 85 MMHG

## 2022-03-17 DIAGNOSIS — Z12.4 ENCOUNTER FOR PAPANICOLAOU SMEAR FOR CERVICAL CANCER SCREENING: Primary | ICD-10-CM

## 2022-03-17 DIAGNOSIS — Z76.89 ENCOUNTER FOR MENSTRUAL REGULATION: ICD-10-CM

## 2022-03-17 DIAGNOSIS — K64.4 EXTERNAL HEMORRHOID: ICD-10-CM

## 2022-03-17 DIAGNOSIS — Z01.419 ENCOUNTER FOR WELL WOMAN EXAM WITH ROUTINE GYNECOLOGICAL EXAM: ICD-10-CM

## 2022-03-17 PROCEDURE — 99396 PREV VISIT EST AGE 40-64: CPT | Performed by: OBSTETRICS & GYNECOLOGY

## 2022-03-17 RX ORDER — NORETHINDRONE ACETATE AND ETHINYL ESTRADIOL 1MG-20(21)
1 KIT ORAL DAILY
Qty: 3 DOSE PACK | Refills: 4 | Status: SHIPPED | OUTPATIENT
Start: 2022-03-17

## 2022-03-17 NOTE — PATIENT INSTRUCTIONS
Hemorroides: Instrucciones de cuidado  Hemorrhoids: Care Instructions  Instrucciones de [place] hemorroides son Elesa Robbi agrandadas que se desarrollan en el canal del ano. Los síntomas más comunes son sangrado alma rosa las evacuaciones del intestino, comezón, hinchazón y dolor rectal. A veces pueden ser incómodas, darren las hemorroides desi vez son un problema grave. [address] hemorroides se pueden tratar con cambios sencillos en michelle Lety Cull y sofia hábitos intestinales. Entre estos cambios se encuentran consumir más [place] y no esforzarse (pujar) para eliminar las heces (defecar). [address] hemorroides no requieren de [place] u otro tratamiento a menos que margaret muy grandes y dolorosas o sangren mucho. La atención de seguimiento es kinsey parte clave de michelle tratamiento y seguridad. Asegúrese de hacer y acudir a todas las citas, y llame a michelle médico si está teniendo problemas. También es kinsey buena idea saber los resultados de sofia exámenes y mantener kinsey lista de los medicamentos que jolene. ¿Cómo puede cuidarse en el hogar? · Siéntese en unas pocas pulgadas de agua tibia (baño de asiento) 3 veces al día y después de evacuar el intestino. El agua tibia ayuda con el dolor y la comezón. · Colóquese hielo en la yoandy anal varias veces al día alma rosa 10 minutos cada vez. Póngase un paño rosa entre el hielo y la piel. Enseguida póngase kinsey toalla húmeda tibia en la yoandy alma rosa otros 10 a 20 minutos. · [employer] analgésicos (medicamentos para el dolor) exactamente según las indicaciones. ? Si el médico le recetó un analgésico, tómelo según las indicaciones. ? Si no está tomando un analgésico recetado, pregúntele a michelle médico si puede yolis peng de [employer]. · Mantenga limpia la yoandy del ano, darren límpiese con suavidad. Utilice agua y [address] [facility], [facility], o use toallitas para bebé o [address] [place] [address] medicinales, ally las de [place].   · Utilice ropa interior de algodón y ropa holgada para reducir la humedad en la yoandy del ano. · [employer]. Marilyn Ogren ally panes y cereales integrales, vegetales crudos, frutas crudas y secas, y frijoles (habichuelas). · Cynthia mucho líquido. Si tiene kinsey enfermedad renal, cardíaca o hepática y tiene que restringir los líquidos, hable con michelle médico antes de aumentar la cantidad de líquido que hasmukh. · Utilice un ablandador de heces que contenga salvado o psilio. Puede ahorrar dinero comprando salvado o psilio (disponible a granel en la mayoría de las tiendas naturistas) y [employer] comidas o mezclándolo con jugo de fruta. O puede utilizar productos ally Metamucil o Hydrocil. · Practique hábitos intestinales saludables. ? Vaya al baño tan pronto ally tenga ganas. ? Evite esforzarse al evacuar. Relájese y dese tiempo para dejar que las cosas ocurran de forma natural.  ? No contenga la respiración mientras evacúa. ? No juan mientras está sentado en el inodoro. Levántese del inodoro menezes pronto haya terminado. · [employer] medicamentos exactamente ally le fueron recetados. Llame a michelle médico si beatriz estar teniendo problemas con michelle medicamento. ¿Cuándo debe pedir ayuda? Llame al 911 en cualquier momento que considere que necesita atención de emergencia. Por ejemplo, llame si:    · Jessy heces son de color rojizo o muy sanguinolentas (con mariano). Llame a michelle médico ahora mismo o busque atención médica inmediata si:    · Siente un dolor más intenso.     · Tiene mayor sangrado. Preste especial atención a los cambios en michelle lacy y asegúrese de comunicarse con michelle médico si:    · Jessy síntomas no mane dylan después de 3 ó 4 días. ¿Dónde puede encontrar más información en inglés? Vaya a http://www.gray.com/  Pepper Q567 en la búsqueda para aprender más acerca de \"Hemorroides: Instrucciones de cuidado. \"  Revisado: 8 septiembre, 2021               Versión del contenido: 13.2  © 4273-2484 Healthwise, Incorporated. Las instrucciones de cuidado fueron adaptadas bajo licencia por [employer] Help [employer]ctions (which disclaims liability or warranty for this information). Si usted tiene [place] afección médica o sobre estas instrucciones, siempre pregunte a michelle profesional de lacy. [facility], Incorporated niega toda garantía o responsabilidad por michelle uso de esta información. Estreñimiento: Instrucciones de cuidado  Constipation: Care Instructions  Instrucciones de cuidado     Tener estreñimiento significa que usted tiene dificultades para eliminar las heces (evacuaciones del intestino). Las personas eliminan heces entre 3 veces al día y Max Hock vez cada 3 nell. Lo que es normal para usted puede ser [employer]. El estreñimiento puede ocurrir con dolor en el recto y cólicos. El dolor podría empeorar cuando trata de eliminar las heces. A veces hay pequeñas cantidades de mariano kacy viva en el papel higiénico o en la superficie de las heces. [place] se debe a las venas dilatadas cerca del recto (hemorroides). Algunos cambios en michelle Michele Bleacher y estilo de osiris podrían ayudarle a evitar el estreñimiento continuo. Es posible que el médico además le recete medicamentos para ayudar a aflojar las heces. Algunos medicamentos pueden causar estreñimiento. [place] incluye los analgésicos (medicamentos para el dolor) y los antidepresivos. Infórmele a michelle médico sobre Christal Farrell que usted jolene. Es posible que michelle médico quiera cambiar un medicamento para aliviar sofia síntomas. La atención de seguimiento es kinsey parte clave de michelle tratamiento y seguridad. Asegúrese de hacer y acudir a todas las citas, y llame a michelle médico si está teniendo problemas. También es kinsey buena idea saber los resultados de sofia exámenes y mantener kinsey lista de los medicamentos que jolene. ¿Cómo puede cuidarse en el hogar? · Cynthia mucho líquido.  Si tiene kinsey enfermedad renal, cardíaca o hepática y tiene que restringir los líquidos, hable con michelle médico antes de aumentar la cantidad de líquido que hasmukh. · Incluya en michelle alimentación diaria alimentos ricos en fibra. Estos incluyen frutas, verduras, frijoles (habichuelas) y granos integrales. · Faye por lo menos 30 minutos de ejercicio la mayoría de los días de la [place]. Caminar es kinsey buena opción. Es posible que también quiera hacer otras actividades, ally correr, nadar, [employer], o jugar al tenis u otros deportes de equipo. · [place] un suplemento de Nisha, ally [employer] o Metamucil, todos los GRASSE. [place] y siga todas las indicaciones de la [place]. · Programe tiempo todos los días para evacuar el intestino. Selma Roy[place] rutina diaria podría ayudar. Tómese mihcelle tiempo para evacuar el intestino. · Apoye los pies sobre un banco o taburete pequeño cuando se siente en el inodoro. [place] ayuda a flexionar las caderas y coloca la pelvis en posición de cuclillas. · Michelle médico podría recomendarle un laxante de venta buck para aliviar el estreñimiento. Lajuanda Freiberg son Milk of Magnesia y [place]. [place] y siga todas las instrucciones de la [place]. No use laxantes de [employer]. ¿Cuándo debe pedir ayuda? Llame a michelle médico ahora mismo o busque atención médica inmediata si:    · Tiene dolor abdominal nuevo o peor.     · Tiene náuseas o vómito nuevos o peores.     · Tiene mariano en las heces. Preste especial atención a los cambios en michelle lacy y asegúrese de comunicarse con michelle médico si:    · Michelle estreñimiento empeora.     · No mejora ally se esperaba. ¿Dónde puede encontrar más información en inglés? Vaya a http://www.gray.com/  Escriba P343 en la búsqueda para aprender Graciella Handler de \"Estreñimiento: Instrucciones de cuidado. \"  Revisado: 1 julio, 2909               ETYWBKD del contenido: 13.2  © 9035-6263 Healthwise, Incorporated. Las instrucciones de cuidado fueron adaptadas bajo licencia por Good Help Connections (which disclaims liability or warranty for this information).  Si usted tiene preguntas sobre kinsey afección médica o sobre estas instrucciones, siempre pregunte a michelle profesional de lacy. [facility], Incorporated niega toda garantía o responsabilidad por michelle uso de esta información. Visita de control para personas de 18 a 50 años: Instrucciones de cuidado  Well Visit, Ages 25 to 48: Care Instructions  Generalidades     Las visitas de control pueden ayudarle a mantenerse saludable. Michelle médico ha comprobado michelle estado general de lacy y puede haberle sugerido maneras de cómo cuidarse blake. Michelle médico también puede haberle recomendado pruebas. En el [facility], usted puede ayudar a [employer] enfermedades alimentándose jack, haciendo ejercicio regularmente y [place]. La atención de seguimiento es kinsey parte clave de michelle tratamiento y seguridad. Asegúrese de hacer y acudir a todas las citas, y llame a michelle médico si está teniendo problemas. También es kinsey buena idea saber los resultados de sofia exámenes y mantener kinsey lista de los medicamentos que jolene. ¿Cómo puede cuidarse en el [facility]? · Hágase las pruebas de detección sobre las cuales usted y michelle médico estén de acuerdo en que se las faye. Las pruebas de detección ayudan a detectar enfermedades antes de que aparezcan síntomas. · Coma alimentos saludables. Elija frutas, verduras, granos integrales, proteína y alimentos lácteos con bajo contenido de [place]. Limite las grasas, especialmente las grasas saturadas. Reduzca la sal en michelle dieta. · Limite el alcohol. Si es hombre, no tome más de 2 bebidas al día o 14 bebidas a la semana. Si es geovany, no tome más de 1 bebida al día o 7 bebidas a la semana. · Faye al menos 30 minutos de actividad física la mayoría de los días de la [place]. Caminar es kinsey buena opción. También irvin vez desee hacer otras actividades, ally correr, nadar, montar en bicicleta o jugar al tenis o practicar deportes en equipo.  Hable sobre cualquier cambio en michelle programa de ejercicio con michelle médico.  · Alcance un peso saludable y manténgalo. [place] disminuirá el riesgo de tener [employer], tales ally obesidad, diabetes, enfermedad cardíaca y presión arterial reece. · No fume ni permita que otros fumen cerca de usted. Si necesita ayuda para dejar de fumar, hable con michelle médico sobre programas y medicamentos para dejar de fumar. Pueden aumentar sofia probabilidades de dejar el hábito para siempre. · Preste atención a michelle lacy mental. Es fácil sentirse agobiado por las preocupaciones y el estrés. Aprenda estrategias para enfrentar el estrés, ally la respiración profunda y la [place], y manténgase vinculado con michelle gabo y la comunidad. Si nota que a menudo se siente corey o desesperanzado, hable con michelle médico. El tratamiento puede ayudar. · Hable con michelle médico acerca de si tiene algún factor de riesgo para infecciones de transmisión sexual (STI, por sofia siglas en inglés). Usted puede ayudar a prevenir STI si espera a tener relaciones con kinsey nueva calli (o parejas) hasta que cada peng se haya hecho pruebas de STI. También es kinsey buena idea usar condones (condones masculinos o femeninos) y limitar sofia parejas sexuales a kinsey clay persona que tenga relaciones sexuales solo con usted. Existen vacunas para algunas STI, ally el [facility]. · Use un método anticonceptivo si es importante para usted prevenir un embarazo. Hable con michelle médico acerca de las opciones disponibles y cuál podría ser la mejor para usted. · Si beatriz que pudiera tener un problema de consumo de alcohol o de drogas, hable con michelle médico. [place] incluye medicamentos de venta bajo receta (ally anfetaminas y opioides) y drogas ilegales (Yamilka Claude y [place]). Michelle médico puede ayudarle a determinar qué tipo de tratamiento es el mejor para usted. · Protéjase la piel del exceso de sol. [address] 10 a.m. y las 4 p.m., permanezca a la azeem o [place] con prendas de vestir y un sombrero de ala ancha.  Use gafas de sol que bloqueen los peyton ultravioleta. Póngase un protector solar de amplio espectro (SPF 30 o superior) en la piel expuesta, incluso cuando esté nublado. · Acuda al dentista [facility] veces al año para hacerse chequeos y limpiezas dentales. · En el automóvil, use el cinturón de seguridad. ¿Cuándo debe pedir ayuda? Preste especial atención a los cambios en michelle lacy y asegúrese de comunicarse con michelle médico si tiene problemas o síntomas que le preocupan. ¿Dónde puede encontrar más información en inglés? Vaya a http://[email or web address]/  [employer] P072 en la búsqueda para aprender más acerca de \"Visita de control para personas de 18 a 50 años: Instrucciones de cuidado. \"  Revisado: 6 octubre, 2021               Versión del contenido: 13.2  © 8059-3385 Healthwise, Incorporated. Las instrucciones de cuidado fueron adaptadas bajo licencia por Good Help Connections (which disclaims liability or warranty for this information). Si usted tiene Nelson Brilliant afección médica o sobre estas instrucciones, siempre pregunte a michelle profesional de lacy. [employer] niega toda garantía o responsabilidad por michelle uso de esta información.

## 2022-03-17 NOTE — PROGRESS NOTES
[facility]  [email or web address]  [phone]    Gael Chi MD, FACOG       Annual Gynecologic Exam  WWE 15-74  Chief Complaint   Patient presents with    Follow-up    Well Woman       Jessie Nixon is a 55 y.o. No obstetric history on file. /  female who presents for an annual exam.  Patient's last menstrual period was 03/10/2022. .     ID: [id number]    Patient has the following concerns today: doing well. F/u from SIS/IUD removal/endometrial bx done on 2021. Started OCP. After the IUD was removed, she had 2 weeks of light bleeding, then regulated. She does not report any AUB. Denies pain. Pt would like to discuss results from 21. Menstrual status:  She does not report dysmenorrhea/painful menses. She does not report heavy menses. She does not report irregular bleeding. Sexual history and Contraception:  Social History     Substance and Sexual Activity   Sexual Activity Yes    Partners: Male    Birth control/protection: Pill       She does not reports new sexual partner(s) in the last year. Preventive Medicine History:  Her most recent Pap smear result: normal was obtained in 2018    Her most recent HR HPV screen was Negative obtained in     She does not have a history of LIZZ 2, 3 or cervical cancer. Breast health:  [facility] Results (most recent):  No results found for this or any previous visit. Last mammogram: was normal, per pt need records (). Breast cancer family updated: see FH. Past Medical History:   Diagnosis Date    Acute endometritis 2021    Encounter for removal of intrauterine contraceptive device (IUD) 2021    Pap smear for cervical cancer screening 2018    Negative, HPV negative     OB History   Obstetric Comments   .     VD x 5.         Past Surgical History:   Procedure Laterality Date    HX OTHER SURGICAL  2021    endometrial biopsy: benign/unsuffiecent endometrium --> rec repeat; TP     History reviewed. No pertinent family history. Social History     Socioeconomic History    Marital status: SINGLE     Spouse name: Not on file    Number of children: Not on file    Years of education: Not on file    Highest education level: Not on file   Occupational History    Not on file   Tobacco Use    Smoking status: Never Smoker    Smokeless tobacco: Never Used   Vaping Use    Vaping Use: Never used   Substance and Sexual Activity    Alcohol use: Not Currently    Drug use: Never    Sexual activity: Yes     Partners: Male     Birth control/protection: Pill   Other Topics Concern    Not on file   Social History Narrative    Not on file     Social Determinants of Health     Financial Resource Strain:     Difficulty of Paying Living Expenses: Not on file   Food Insecurity:     Worried About Running Out of Food in the Last Year: Not on file    Asya of Food in the Last Year: Not on file   Transportation Needs:     Lack of Transportation (Medical): Not on file    Lack of Transportation (Non-Medical):  Not on file   Physical Activity:     Days of Exercise per Week: Not on file    Minutes of Exercise per Session: Not on file   Stress:     Feeling of Stress : Not on file   Social Connections:     Frequency of Communication with Friends and Family: Not on file    Frequency of Social Gatherings with Friends and Family: Not on file    Attends Voodoo Services: Not on file    Active Member of Clubs or Organizations: Not on file    Attends Club or Organization Meetings: Not on file    Marital Status: Not on file   Intimate Partner Violence:     Fear of Current or Ex-Partner: Not on file    Emotionally Abused: Not on file    Physically Abused: Not on file    Sexually Abused: Not on file   Housing Stability:     Unable to Pay for Housing in the Last Year: Not on file    Number of [place] in the Last Year: Not on file    Unstable Housing in the Last Year: Not on file       No Known Allergies    Current Outpatient Medications   Medication Sig    norethindrone-ethinyl estradiol (JUNEL FE 1/20) 1 mg-20 mcg (21)/75 mg (7) tab Take 1 Tablet by mouth daily.  loratadine 10 mg cap Take  by mouth.  FeroSuL 325 mg (65 mg iron) tablet     medroxyPROGESTERone (Depo-Provera) 150 mg/mL syrg 1 ml (Patient not taking: Reported on 3/17/2022)     No current facility-administered medications for this visit. There is no problem list on file for this patient.       Review of Systems - History obtained from the patient  Constitutional/general, HEENT, CV, Resp, GI, MSK, Neuro, Psych, Heme/lymph, Skin, Breast ROS: no significant complaints except as noted on HPI     Physical Exam  Visit Vitals  /85   Pulse 91   Ht 5' (1.524 m)   Wt 190 lb 12.8 oz (86.5 kg)   LMP 03/10/2022   BMI 37.26 kg/m²       Constitutional  · Appearance: well-nourished, well developed, alert, in no acute distress    HENT  · Head and Face: appears normal    Neck  · Inspection/Palpation: normal appearance, no masses or tenderness  · Lymph Nodes: no lymphadenopathy present  · Thyroid: gland size normal, nontender, no nodules or masses present on palpation    Chest  · Respiratory Effort: breathing unlabored  · Auscultation: normal breath sounds    Cardiovascular  · Heart:  · Auscultation: regular rate and rhythm without murmur    Breasts  · Inspection of Breasts: breasts symmetrical, no skin changes, no discharge present, nipple appearance normal, no skin retraction present  · Palpation of Breasts and Axillae: no masses present on palpation, no breast tenderness  · Axillary Lymph Nodes: no lymphadenopathy present    Gastrointestinal  · Abdominal Examination: abdomen non-tender to palpation, normal bowel sounds, no masses present  · Liver and spleen: no hepatomegaly present, spleen not palpable  · Hernias: no hernias identified    Genitourinary  · External Genitalia: normal appearance for age, no discharge present, no tenderness present, no inflammatory lesions present, no masses present, without atrophy present  · Vagina: normal vaginal vault without central or paravaginal defects, no discharge present, no inflammatory lesions present, no masses present  · Bladder: non-tender to palpation  · Urethra: appears normal  · Cervix: normal   · Uterus: normal size, shape and consistency  · Adnexa: no adnexal tenderness present, no adnexal masses present  · Perineum: perineum within normal limits, no evidence of trauma, no rashes or skin lesions present  · Anus: anus within normal limits, external hemorrhoids present  · Inguinal Lymph Nodes: no lymphadenopathy present    Skin  · General Inspection: no rash, no lesions identified    Neurologic/Psychiatric  · Mental Status:  · Orientation: grossly oriented to person, place and time  · Mood and Affect: mood normal, affect appropriate    Assessment:  55 y.o. No obstetric history on file. for well woman exam  Encounter Diagnoses   Name Primary?  Encounter for Papanicolaou smear for cervical cancer screening Yes    Encounter for well woman exam with routine gynecological exam     External hemorrhoid     Encounter for menstrual regulation        Plan:  The patient was counseled about healthy lifestyle, disease prevention, and bone protection. We discussed current self breast exam and mammogram recommendations  We discussed current pap smear and HR HPV testing guidelines  I recommended follow up in one year for routine annual gynecologic exam or sooner if needed  I recommended follow up with a primary care physician for chronic medical problems and evaluation of non-gynecologic concerns and to please contact our office with any GYN questions or concerns.   Discussed risks, benefits and alternatives of OCP/nuvaring/patch: including but not limited to dvt/pe/mi/cva/ca/gi risks and that smoking, increasing age and other health conditions can increase these risks. We discussed progesterone only and non hormonal options for contraception including but not limited to condoms, IUDs, Nexplanon, and depo provera. Pt wants to continue ocp  Reviewed endometrial pathology with pt  Notify MD if AUB recurs  Bowel regimen  Disc options for hemorrhoids       Folllow up:  [x] return for annual well woman exam in one year or sooner if she is having problems  [] follow up and ultrasound  [x] mammogram  [] 6 months  [] 3 months  [] 1 month    Orders Placed This Encounter    norethindrone-ethinyl estradiol (JUNEL FE 1/20) 1 mg-20 mcg (21)/75 mg (7) tab    PAP IG, APTIMA HPV AND RFX 16/18,45 ([id number])       No results found for any visits on 03/17/22.

## 2022-03-21 LAB
CYTOLOGIST CVX/VAG CYTO: NORMAL
CYTOLOGY CVX/VAG DOC CYTO: NORMAL
CYTOLOGY CVX/VAG DOC THIN PREP: NORMAL
DX ICD CODE: NORMAL
HPV I/H RISK 4 DNA CVX QL PROBE+SIG AMP: NEGATIVE
Lab: NORMAL
OTHER STN SPEC: NORMAL
STAT OF ADQ CVX/VAG CYTO-IMP: NORMAL

## 2022-03-21 NOTE — PROGRESS NOTES
Normal pap smear, message sent if [facility] active. Update PMH/: include: Date of pap, Cytology: wnl. For HR HPV results: list NEG or POS, when done.

## 2023-05-18 PROCEDURE — 36415 COLL VENOUS BLD VENIPUNCTURE: CPT

## 2023-05-18 PROCEDURE — 87086 URINE CULTURE/COLONY COUNT: CPT

## 2023-05-19 ENCOUNTER — HOSPITAL ENCOUNTER (OUTPATIENT)
Facility: HOSPITAL | Age: 48
Setting detail: SPECIMEN
Discharge: HOME | End: 2023-05-22

## 2023-05-21 LAB
BACTERIA SPEC CULT: NORMAL
CC UR VC: NORMAL
SERVICE CMNT-IMP: NORMAL